# Patient Record
Sex: MALE | Race: WHITE | NOT HISPANIC OR LATINO | URBAN - METROPOLITAN AREA
[De-identification: names, ages, dates, MRNs, and addresses within clinical notes are randomized per-mention and may not be internally consistent; named-entity substitution may affect disease eponyms.]

---

## 2020-04-13 ENCOUNTER — EMERGENCY (EMERGENCY)
Facility: HOSPITAL | Age: 40
LOS: 1 days | Discharge: HOME | End: 2020-04-13
Attending: EMERGENCY MEDICINE | Admitting: EMERGENCY MEDICINE
Payer: SELF-PAY

## 2020-04-13 VITALS — HEART RATE: 98 BPM | SYSTOLIC BLOOD PRESSURE: 135 MMHG | DIASTOLIC BLOOD PRESSURE: 85 MMHG

## 2020-04-13 VITALS
WEIGHT: 160.06 LBS | SYSTOLIC BLOOD PRESSURE: 156 MMHG | RESPIRATION RATE: 15 BRPM | HEART RATE: 136 BPM | DIASTOLIC BLOOD PRESSURE: 78 MMHG | OXYGEN SATURATION: 98 % | TEMPERATURE: 99 F | HEIGHT: 73 IN

## 2020-04-13 LAB
ALBUMIN SERPL ELPH-MCNC: 4.4 G/DL — SIGNIFICANT CHANGE UP (ref 3.4–5)
ALP SERPL-CCNC: 43 U/L — SIGNIFICANT CHANGE UP (ref 40–120)
ALT FLD-CCNC: 17 U/L — SIGNIFICANT CHANGE UP (ref 12–42)
ANION GAP SERPL CALC-SCNC: 7 MMOL/L — LOW (ref 9–16)
AST SERPL-CCNC: 19 U/L — SIGNIFICANT CHANGE UP (ref 15–37)
BASOPHILS # BLD AUTO: 0.04 K/UL — SIGNIFICANT CHANGE UP (ref 0–0.2)
BASOPHILS NFR BLD AUTO: 0.6 % — SIGNIFICANT CHANGE UP (ref 0–2)
BILIRUB SERPL-MCNC: 0.7 MG/DL — SIGNIFICANT CHANGE UP (ref 0.2–1.2)
BUN SERPL-MCNC: 11 MG/DL — SIGNIFICANT CHANGE UP (ref 7–23)
CALCIUM SERPL-MCNC: 9.3 MG/DL — SIGNIFICANT CHANGE UP (ref 8.5–10.5)
CHLORIDE SERPL-SCNC: 103 MMOL/L — SIGNIFICANT CHANGE UP (ref 96–108)
CO2 SERPL-SCNC: 26 MMOL/L — SIGNIFICANT CHANGE UP (ref 22–31)
CREAT SERPL-MCNC: 0.9 MG/DL — SIGNIFICANT CHANGE UP (ref 0.5–1.3)
EOSINOPHIL # BLD AUTO: 0.01 K/UL — SIGNIFICANT CHANGE UP (ref 0–0.5)
EOSINOPHIL NFR BLD AUTO: 0.1 % — SIGNIFICANT CHANGE UP (ref 0–6)
GLUCOSE SERPL-MCNC: 98 MG/DL — SIGNIFICANT CHANGE UP (ref 70–99)
HCT VFR BLD CALC: 38.8 % — LOW (ref 39–50)
HGB BLD-MCNC: 13.4 G/DL — SIGNIFICANT CHANGE UP (ref 13–17)
IMM GRANULOCYTES NFR BLD AUTO: 0.3 % — SIGNIFICANT CHANGE UP (ref 0–1.5)
LYMPHOCYTES # BLD AUTO: 2.01 K/UL — SIGNIFICANT CHANGE UP (ref 1–3.3)
LYMPHOCYTES # BLD AUTO: 28.6 % — SIGNIFICANT CHANGE UP (ref 13–44)
MCHC RBC-ENTMCNC: 31.2 PG — SIGNIFICANT CHANGE UP (ref 27–34)
MCHC RBC-ENTMCNC: 34.5 GM/DL — SIGNIFICANT CHANGE UP (ref 32–36)
MCV RBC AUTO: 90.4 FL — SIGNIFICANT CHANGE UP (ref 80–100)
MONOCYTES # BLD AUTO: 0.42 K/UL — SIGNIFICANT CHANGE UP (ref 0–0.9)
MONOCYTES NFR BLD AUTO: 6 % — SIGNIFICANT CHANGE UP (ref 2–14)
NEUTROPHILS # BLD AUTO: 4.54 K/UL — SIGNIFICANT CHANGE UP (ref 1.8–7.4)
NEUTROPHILS NFR BLD AUTO: 64.4 % — SIGNIFICANT CHANGE UP (ref 43–77)
NRBC # BLD: 0 /100 WBCS — SIGNIFICANT CHANGE UP (ref 0–0)
PLATELET # BLD AUTO: 207 K/UL — SIGNIFICANT CHANGE UP (ref 150–400)
POTASSIUM SERPL-MCNC: 3.6 MMOL/L — SIGNIFICANT CHANGE UP (ref 3.5–5.3)
POTASSIUM SERPL-SCNC: 3.6 MMOL/L — SIGNIFICANT CHANGE UP (ref 3.5–5.3)
PROT SERPL-MCNC: 7.2 G/DL — SIGNIFICANT CHANGE UP (ref 6.4–8.2)
RBC # BLD: 4.29 M/UL — SIGNIFICANT CHANGE UP (ref 4.2–5.8)
RBC # FLD: 12.3 % — SIGNIFICANT CHANGE UP (ref 10.3–14.5)
SODIUM SERPL-SCNC: 136 MMOL/L — SIGNIFICANT CHANGE UP (ref 132–145)
WBC # BLD: 7.04 K/UL — SIGNIFICANT CHANGE UP (ref 3.8–10.5)
WBC # FLD AUTO: 7.04 K/UL — SIGNIFICANT CHANGE UP (ref 3.8–10.5)

## 2020-04-13 PROCEDURE — 93010 ELECTROCARDIOGRAM REPORT: CPT

## 2020-04-13 PROCEDURE — 99053 MED SERV 10PM-8AM 24 HR FAC: CPT

## 2020-04-13 PROCEDURE — 99284 EMERGENCY DEPT VISIT MOD MDM: CPT

## 2020-04-13 RX ORDER — SODIUM CHLORIDE 9 MG/ML
1000 INJECTION INTRAMUSCULAR; INTRAVENOUS; SUBCUTANEOUS ONCE
Refills: 0 | Status: COMPLETED | OUTPATIENT
Start: 2020-04-13 | End: 2020-04-13

## 2020-04-13 RX ORDER — FAMOTIDINE 10 MG/ML
20 INJECTION INTRAVENOUS ONCE
Refills: 0 | Status: COMPLETED | OUTPATIENT
Start: 2020-04-13 | End: 2020-04-13

## 2020-04-13 RX ORDER — DIPHENHYDRAMINE HCL 50 MG
50 CAPSULE ORAL ONCE
Refills: 0 | Status: COMPLETED | OUTPATIENT
Start: 2020-04-13 | End: 2020-04-13

## 2020-04-13 RX ADMIN — Medication 125 MILLIGRAM(S): at 12:22

## 2020-04-13 RX ADMIN — FAMOTIDINE 20 MILLIGRAM(S): 10 INJECTION INTRAVENOUS at 12:22

## 2020-04-13 RX ADMIN — Medication 1 MILLIGRAM(S): at 12:22

## 2020-04-13 RX ADMIN — SODIUM CHLORIDE 1000 MILLILITER(S): 9 INJECTION INTRAMUSCULAR; INTRAVENOUS; SUBCUTANEOUS at 12:22

## 2020-04-13 RX ADMIN — Medication 50 MILLIGRAM(S): at 12:22

## 2020-04-13 NOTE — ED PROVIDER NOTE - CLINICAL SUMMARY MEDICAL DECISION MAKING FREE TEXT BOX
Pt presents to ED c/o persistent constant allergic reaction since 3am after smoking crystal meth. No airway compromise or visual urticarial rash on exam. Pt endorsing some chest tightness, SOB, and anxiety in ED. Give IV fluids, Benadryl, Pepcid, and Solumedrol. Give Ativan for anxiety. Check basic labs and EKG.  otherwise VS within normal range. Reassess. Pt presents to ED c/o persistent constant allergic reaction since 3am after smoking crystal meth. No airway compromise or visual urticarial rash on exam. Pt endorsing some chest tightness, SOB, and anxiety in ED. Give IV fluids, Benadryl, Pepcid, and Solumedrol. Give Ativan for anxiety. Check basic labs and EKG.  otherwise VS within normal range. Reassess.    The patient's symptoms progressively improved throughout the ED stay.  ED evaluation and management discussed with the patient and family (if available) in detail.  Close PMD and/or specialist follow up encouraged.  Strict ED return instructions discussed in detail for any worsening or new symptoms. The patient was given the opportunity to ask any questions about their discharge diagnosis and discharge instructions.  The patient verbalized understanding of these instructions and need to return to the ED for any worsening of illness or for any concern. The patient understands the Emergency Department diagnosis is a preliminary diagnosis often based on limited information and that the patient must adhere to the follow-up plan as discussed. The patient tolerated PO fluids.  At the time of discharge from the Emergency Department, the patient is alert with fluent appropriate speech and ambulatory without difficulty.  A medical screening examination was performed and no emergency medical condition was identified.

## 2020-04-13 NOTE — ED PROVIDER NOTE - OBJECTIVE STATEMENT
Present in room: Dr. Cochran, patient   Triage note: "suspects he is having an allergic reaction to crystal meth, "I think I smoked a bad batch last night", c/o body rash and throat tightness"    39 y o male, on Truvada prep, presents to ED c/o allergic reaction to crystal meth. Pt admits to having smoked it at 2am today and developed general itchy body rash, throat tightness, chest tightness, and some SOB about 1 hour later. Sx have persisted and remain unchanged since then. Pt denies having similar reactions after smoking crystal meth in the past. Pt denies recently eating strange foods. He took a dose of Wellbutrin PTA but is currently endorsing feeling anxious. Pt denies tongue swelling or facial swelling. No abd pain or N/V/D. Pt denies known exposure to positive COVID-19 persons. No fever, chills, cough, fatigue, back pain, or other sx endorsed. No recent travel.      Triage VS: /78, , RR 15, Temp 98.6 F, O2 98% Present in room: Dr. Cochran, patient   Triage note: "suspects he is having an allergic reaction to crystal meth, "I think I smoked a bad batch last night", c/o body rash and throat tightness"    39 y o male, on Truvada prep, presents to ED c/o possible allergic reaction to crystal meth. Pt admits to having smoked it at 2am today and developed general itchy body rash,and throat tightness about 1 hour later. Sx have persisted and remain unchanged since then. Pt denies having similar reactions after smoking crystal meth in the past. Pt denies recently eating strange foods. He took a dose of Wellbutrin PTA but is currently endorsing feeling anxious. Pt denies tongue swelling or facial swelling. throat swelling sensation has resolved No abd pain or N/V/D. Pt denies known exposure to positive COVID-19 persons. No fever, chills, cough, fatigue, back pain, or other sx endorsed. No recent travel.      Triage VS: /78, , RR 15, Temp 98.6 F, O2 98%

## 2020-04-13 NOTE — ED PROVIDER NOTE - CHPI ED SYMPTOMS NEG
no fever, no chills, no fatigue, no back pain, no abdominal pain, no diarrhea/no swelling of face, tongue/no nausea/no vomiting

## 2020-04-13 NOTE — ED PROVIDER NOTE - ENMT, MLM
Airway patent, Nasal mucosa clear. Mouth with normal mucosa. No tonsillar or oropharyngeal swelling. No tongue swelling.

## 2020-04-13 NOTE — ED PROVIDER NOTE - PATIENT PORTAL LINK FT
You can access the FollowMyHealth Patient Portal offered by [facility] by registering at the following website: [email or web address]. By joining [facility]’s FollowMyHealth portal, you will also be able to view your health information using other applications (apps) compatible with our system.

## 2020-04-13 NOTE — ED ADULT TRIAGE NOTE - CHIEF COMPLAINT QUOTE
suspects he is having an allergic reaction to crystal meth, "I think I smoked a bad batch last night", c/o body rash and throat tightness

## 2020-04-17 DIAGNOSIS — F15.10 OTHER STIMULANT ABUSE, UNCOMPLICATED: ICD-10-CM
